# Patient Record
Sex: MALE | Race: WHITE | NOT HISPANIC OR LATINO
[De-identification: names, ages, dates, MRNs, and addresses within clinical notes are randomized per-mention and may not be internally consistent; named-entity substitution may affect disease eponyms.]

---

## 2021-07-29 PROBLEM — Z00.00 ENCOUNTER FOR PREVENTIVE HEALTH EXAMINATION: Status: ACTIVE | Noted: 2021-07-29

## 2021-08-02 ENCOUNTER — APPOINTMENT (OUTPATIENT)
Dept: NEUROLOGY | Facility: CLINIC | Age: 51
End: 2021-08-02
Payer: COMMERCIAL

## 2021-08-02 VITALS
DIASTOLIC BLOOD PRESSURE: 68 MMHG | BODY MASS INDEX: 26.41 KG/M2 | HEIGHT: 72 IN | HEART RATE: 65 BPM | WEIGHT: 195 LBS | SYSTOLIC BLOOD PRESSURE: 125 MMHG | TEMPERATURE: 97.2 F

## 2021-08-02 DIAGNOSIS — Z83.3 FAMILY HISTORY OF DIABETES MELLITUS: ICD-10-CM

## 2021-08-02 DIAGNOSIS — H81.13 BENIGN PAROXYSMAL VERTIGO, BILATERAL: ICD-10-CM

## 2021-08-02 DIAGNOSIS — Z80.3 FAMILY HISTORY OF MALIGNANT NEOPLASM OF BREAST: ICD-10-CM

## 2021-08-02 DIAGNOSIS — H53.8 OTHER VISUAL DISTURBANCES: ICD-10-CM

## 2021-08-02 PROCEDURE — 99214 OFFICE O/P EST MOD 30 MIN: CPT

## 2021-08-04 PROBLEM — Z83.3 FAMILY HISTORY OF DIABETES MELLITUS: Status: ACTIVE | Noted: 2021-08-02

## 2021-08-04 PROBLEM — Z80.3 FAMILY HISTORY OF MALIGNANT NEOPLASM OF BREAST: Status: ACTIVE | Noted: 2021-08-02

## 2021-08-04 PROBLEM — H81.13 BENIGN PAROXYSMAL POSITIONAL VERTIGO DUE TO BILATERAL VESTIBULAR DISORDER: Status: ACTIVE | Noted: 2021-08-04

## 2021-08-04 NOTE — HISTORY OF PRESENT ILLNESS
[FreeTextEntry1] : Antonio Julio is a 51-year-old right-handed man with no pertinent past medical history who is presenting for hospital follow-up after an episode of peripheral vertigo. He presented to [facility] on 6/23/21 with sudden onset dizziness. He tried to do the Epley maneuver as recommended by his primary care physician but he was unable. A stroke code was called. Blood pressure was 118/64, heart rate 82 and respiratory rate 16. He had a CT head and CTA head/neck which did not show an acute process. MRI brain was unremarkable. \par \par During stroke code, he was noted to be lying on his right side with his right ear down. He had right beating rotatory nystagmus.. He was seen by PT and had Epley maneuver performed at bedside.  Symptoms improved somewhat with the Epley maneuver. \par \par Past Medical History: \par none \par \par Allergies \par Penicillin \par \par Surgeries: \par appendectomy\par \par Social History: \par Lives in [place]\par Works as a  \par No cigarettes, no drugs, drinks beer, 1-2 drinks daily, a few days a week \par \par \par \par

## 2021-08-04 NOTE — ASSESSMENT
[FreeTextEntry1] : Recommend vestibular rehab if persistent symptoms \par meclizine prn if persistent symptoms\par ENT referral if persistent symptoms \par requested ophthalmology follow-up for blurred vision at a deficit, - referral provided \par \par Return to clinic as needed \par \par

## 2021-08-04 NOTE — PHYSICAL EXAM
[FreeTextEntry1] : Mental Status: AxOx3, speech: no dysarthria, euthymic affect, mood “good”, attention normal by reversal of months, serial sevens, and spell “world” backwards \par STM 3/3 immediate, 3/3 at five minutes, \par CN: visual acuity, VFF, blink to confrontation \par III, IV, VI, PERRL, EOMI , no nystagmus \par V sensation normal to light touch, pinprick\par VII normal squint vs resistance, normal smile, face symmetric \par VIII: normal hearing \par IX, X normal gag, symmetric palate, uvula raises midline \par XI normal shrug versus resistance and lateralization of head versus resistance \par XII tongue symmetric, normal strength, no tremor or fasciculation \par Motor: full strength throughout \par Sensory: normal to LT and vibration \par Reflexes \par Brachioradialis, biceps, triceps, patella and ankles 2+ \par Plantar flexor response bilaterally \par Coordination: no dysmetria on FNF \par Gait : normal balance and gait, no ataxic movements \par

## 2021-08-04 NOTE — DATA REVIEWED
[de-identified] : MRI brain: 6/23/21: \par CLINICAL INFORMATION: Acute onset vertigo, nausea and vomiting.\par IMAGING FINDINGS: No focal high or low signal intensity lesion is demonstrated\par in the brain. There is no evidence of recent infarction with high signal on\par diffusion imaging. There is no evidence of old infarction. There is no\par evidence of recent or old hemorrhage with low signal on susceptibility\par weighted imaging. There is no hydrocephalus. There is no extra-axial\par collection. Normal vascular flow voids are demonstrated; there is no large\par vessel arterial occlusion.\par \par On the volume acquisition images, cranial nerves VII and VIII can be followed\par from the brainstem through the internal auditory canals. There is no acoustic\par schwannoma.\par No neoplastic replacement of bone marrow is demonstrated.\par IMPRESSION: Normal MRI study of the brain. No posterior fossa abnormality is\par demonstrated.\par \par \par CTA head and neck 6/23/2021\par \par Thin helical axial sections through the head and neck obtained following the\par bolus intravenous administration of contrast. Rapid CTA blood vessel density\par map generated. The patient received 60 ml of Isovue 370. Coronal sagittal\par reconstructed images provided.\par \par \par Comparison made to head CT and brain perfusion study 6/23/2021\par \par CTA neck:\par \par Great vessels: The aortic arch is normal in caliber without evidence of\par aneurysm or dissection. The great vessels are patent with no evidence of\par occlusion, significant stenosis or dissection. \par \par Right carotid artery: The common and internal carotid arteries are patent from\par origin to skull base. There is no evidence of any significant stenosis,\par occlusion or dissection of the common or internal carotid artery.\par \par Left carotid artery: The common carotid and internal arteries are patent from\par origin to skull base. There is no evidence of any significant stenosis,\par occlusion or dissection of the common or internal carotid artery.\par \par Vertebral arteries: There is bilateral contrast patency of the vertebral\par arteries with right larger than left, anatomic variation extending from origin\par to the basilar artery without evidence of any significant stenosis, occlusion\par or dissection. \par \par Soft tissues: The airway is patent. There is no suspicious soft tissue mass.\par \par Cervical spine: There is straightening of cervical spine. There is no acute\par fracture.\par \par Upper thorax: Respiratory motion limits evaluation of possible lung disease.\par \par CTA head:\par \par Anterior circulation: There is contrast patency of the middle and anterior\par cerebral artery major vessels without occlusion/abrupt cut off or any\par significant stenosis.\par \par Posterior circulation: There is contrast patency of the basilar artery\par continuing into the posterior cerebral arteries without occlusion or\par significant stenosis. \par \par There is no evidence of intracranial aneurysm or vascular malformation.\par \par There are no intracranial enhancing lesions.\par \par \par \par \par IMPRESSION:\par \par No evidence of any significant stenosis or occlusion of carotid or vertebral\par arteries.\par \par No evidence of intracranial major vessel arterial occlusion or significant\par stenosis.\par Rapid CTA blood vessel density map is unremarkable. [de-identified] : 6/24/21: LDL 72, Cholesterol 138 , WBC 12 high \par 6/24/21: EKG: Normal sinus rhythm. \par

## 2021-08-04 NOTE — CONSULT LETTER
[Dear  ___] : Dear  [unfilled], [Courtesy Letter:] : I had the pleasure of seeing your patient, [unfilled], in my office today. [Please see my note below.] : Please see my note below. [Sincerely,] : Sincerely, [FreeTextEntry3] : Shasha Dang MD\par [facility] \par [phone]

## 2021-09-24 NOTE — DISCUSSION/SUMMARY
POSITIVE
[FreeTextEntry1] : Antonio is a pleasant 51-year-old man with no pertinent past medical history presenting for hospital follow-up after an event of likely peripheral vertigo, BPPV in June 2021. He had vessel imaging (CTA head/neck) and MRI brain without contrast which did not show an acute process. \par \par He is doing well. Symptoms overall resolving although sometimes he thinks it takes his eyes a second to catch up with movements so has still some lingering sensation of dysequilibrium. \par \par Overall, doing well.